# Patient Record
Sex: FEMALE | Race: WHITE | NOT HISPANIC OR LATINO | URBAN - METROPOLITAN AREA
[De-identification: names, ages, dates, MRNs, and addresses within clinical notes are randomized per-mention and may not be internally consistent; named-entity substitution may affect disease eponyms.]

---

## 2023-06-16 ENCOUNTER — OFFICE VISIT (OUTPATIENT)
Dept: PRIMARY CARE | Facility: CLINIC | Age: 53
End: 2023-06-16
Payer: COMMERCIAL

## 2023-06-16 ENCOUNTER — LAB (OUTPATIENT)
Dept: LAB | Facility: LAB | Age: 53
End: 2023-06-16
Payer: COMMERCIAL

## 2023-06-16 VITALS
BODY MASS INDEX: 27.68 KG/M2 | WEIGHT: 141 LBS | SYSTOLIC BLOOD PRESSURE: 140 MMHG | OXYGEN SATURATION: 97 % | HEART RATE: 80 BPM | HEIGHT: 60 IN | DIASTOLIC BLOOD PRESSURE: 78 MMHG | TEMPERATURE: 97.4 F

## 2023-06-16 DIAGNOSIS — E78.2 HYPERLIPIDEMIA, MIXED: ICD-10-CM

## 2023-06-16 DIAGNOSIS — G89.29 CHRONIC RIGHT SHOULDER PAIN: ICD-10-CM

## 2023-06-16 DIAGNOSIS — I10 HYPERTENSION, BENIGN: ICD-10-CM

## 2023-06-16 DIAGNOSIS — Z00.00 VISIT FOR PREVENTIVE HEALTH EXAMINATION: ICD-10-CM

## 2023-06-16 DIAGNOSIS — E55.9 VITAMIN D DEFICIENCY: ICD-10-CM

## 2023-06-16 DIAGNOSIS — M25.511 CHRONIC RIGHT SHOULDER PAIN: ICD-10-CM

## 2023-06-16 DIAGNOSIS — Z12.11 ENCOUNTER FOR SCREENING FOR MALIGNANT NEOPLASM OF COLON: ICD-10-CM

## 2023-06-16 DIAGNOSIS — Z00.00 VISIT FOR PREVENTIVE HEALTH EXAMINATION: Primary | ICD-10-CM

## 2023-06-16 PROBLEM — F32.A DEPRESSION: Status: ACTIVE | Noted: 2023-06-16

## 2023-06-16 PROBLEM — F41.9 ANXIETY: Status: ACTIVE | Noted: 2023-06-16

## 2023-06-16 LAB
ALANINE AMINOTRANSFERASE (SGPT) (U/L) IN SER/PLAS: 29 U/L (ref 7–45)
ALBUMIN (G/DL) IN SER/PLAS: 4.6 G/DL (ref 3.4–5)
ALKALINE PHOSPHATASE (U/L) IN SER/PLAS: 45 U/L (ref 33–110)
ANION GAP IN SER/PLAS: 13 MMOL/L (ref 10–20)
ASPARTATE AMINOTRANSFERASE (SGOT) (U/L) IN SER/PLAS: 27 U/L (ref 9–39)
BILIRUBIN TOTAL (MG/DL) IN SER/PLAS: 0.4 MG/DL (ref 0–1.2)
CALCIDIOL (25 OH VITAMIN D3) (NG/ML) IN SER/PLAS: 23 NG/ML
CALCIUM (MG/DL) IN SER/PLAS: 10.4 MG/DL (ref 8.6–10.6)
CARBON DIOXIDE, TOTAL (MMOL/L) IN SER/PLAS: 28 MMOL/L (ref 21–32)
CHLORIDE (MMOL/L) IN SER/PLAS: 106 MMOL/L (ref 98–107)
CHOLESTEROL (MG/DL) IN SER/PLAS: 226 MG/DL (ref 0–199)
CHOLESTEROL IN HDL (MG/DL) IN SER/PLAS: 64 MG/DL
CHOLESTEROL/HDL RATIO: 3.5
CREATININE (MG/DL) IN SER/PLAS: 0.87 MG/DL (ref 0.5–1.05)
ERYTHROCYTE DISTRIBUTION WIDTH (RATIO) BY AUTOMATED COUNT: 14.4 % (ref 11.5–14.5)
ERYTHROCYTE MEAN CORPUSCULAR HEMOGLOBIN CONCENTRATION (G/DL) BY AUTOMATED: 32 G/DL (ref 32–36)
ERYTHROCYTE MEAN CORPUSCULAR VOLUME (FL) BY AUTOMATED COUNT: 91 FL (ref 80–100)
ERYTHROCYTES (10*6/UL) IN BLOOD BY AUTOMATED COUNT: 4.85 X10E12/L (ref 4–5.2)
GFR FEMALE: 80 ML/MIN/1.73M2
GLUCOSE (MG/DL) IN SER/PLAS: 74 MG/DL (ref 74–99)
HEMATOCRIT (%) IN BLOOD BY AUTOMATED COUNT: 44.1 % (ref 36–46)
HEMOGLOBIN (G/DL) IN BLOOD: 14.1 G/DL (ref 12–16)
LDL: 111 MG/DL (ref 0–99)
LEUKOCYTES (10*3/UL) IN BLOOD BY AUTOMATED COUNT: 4.7 X10E9/L (ref 4.4–11.3)
NON HDL CHOLESTEROL: 162 MG/DL
NRBC (PER 100 WBCS) BY AUTOMATED COUNT: 0 /100 WBC (ref 0–0)
PLATELETS (10*3/UL) IN BLOOD AUTOMATED COUNT: 219 X10E9/L (ref 150–450)
POTASSIUM (MMOL/L) IN SER/PLAS: 4.5 MMOL/L (ref 3.5–5.3)
PROTEIN TOTAL: 7.1 G/DL (ref 6.4–8.2)
SEDIMENTATION RATE, ERYTHROCYTE: 13 MM/H (ref 0–30)
SODIUM (MMOL/L) IN SER/PLAS: 142 MMOL/L (ref 136–145)
THYROTROPIN (MIU/L) IN SER/PLAS BY DETECTION LIMIT <= 0.05 MIU/L: 0.96 MIU/L (ref 0.44–3.98)
TRIGLYCERIDE (MG/DL) IN SER/PLAS: 255 MG/DL (ref 0–149)
UREA NITROGEN (MG/DL) IN SER/PLAS: 14 MG/DL (ref 6–23)
VLDL: 51 MG/DL (ref 0–40)

## 2023-06-16 PROCEDURE — 99214 OFFICE O/P EST MOD 30 MIN: CPT | Performed by: FAMILY MEDICINE

## 2023-06-16 PROCEDURE — 82306 VITAMIN D 25 HYDROXY: CPT

## 2023-06-16 PROCEDURE — 36415 COLL VENOUS BLD VENIPUNCTURE: CPT

## 2023-06-16 PROCEDURE — 81001 URINALYSIS AUTO W/SCOPE: CPT

## 2023-06-16 PROCEDURE — 85652 RBC SED RATE AUTOMATED: CPT

## 2023-06-16 PROCEDURE — 3078F DIAST BP <80 MM HG: CPT | Performed by: FAMILY MEDICINE

## 2023-06-16 PROCEDURE — 84443 ASSAY THYROID STIM HORMONE: CPT

## 2023-06-16 PROCEDURE — 85027 COMPLETE CBC AUTOMATED: CPT

## 2023-06-16 PROCEDURE — 80053 COMPREHEN METABOLIC PANEL: CPT

## 2023-06-16 PROCEDURE — 1036F TOBACCO NON-USER: CPT | Performed by: FAMILY MEDICINE

## 2023-06-16 PROCEDURE — 3077F SYST BP >= 140 MM HG: CPT | Performed by: FAMILY MEDICINE

## 2023-06-16 PROCEDURE — 99396 PREV VISIT EST AGE 40-64: CPT | Performed by: FAMILY MEDICINE

## 2023-06-16 PROCEDURE — 80061 LIPID PANEL: CPT

## 2023-06-16 RX ORDER — ATORVASTATIN CALCIUM 40 MG/1
40 TABLET, FILM COATED ORAL DAILY
COMMUNITY
End: 2023-06-16 | Stop reason: SDUPTHER

## 2023-06-16 RX ORDER — MELOXICAM 15 MG/1
15 TABLET ORAL DAILY
Qty: 30 TABLET | Refills: 1 | Status: SHIPPED | OUTPATIENT
Start: 2023-06-16 | End: 2023-07-20 | Stop reason: SDUPTHER

## 2023-06-16 RX ORDER — DOXYCYCLINE 100 MG/1
100 CAPSULE ORAL 2 TIMES DAILY
COMMUNITY
Start: 2023-05-26 | End: 2023-09-22 | Stop reason: ALTCHOICE

## 2023-06-16 RX ORDER — BETAMETHASONE DIPROPIONATE 0.5 MG/G
CREAM TOPICAL AS NEEDED
COMMUNITY
Start: 2023-06-01

## 2023-06-16 RX ORDER — HYDROXYZINE HYDROCHLORIDE 25 MG/1
TABLET, FILM COATED ORAL
COMMUNITY
Start: 2023-02-17 | End: 2023-09-22 | Stop reason: SINTOL

## 2023-06-16 RX ORDER — ATENOLOL AND CHLORTHALIDONE TABLET 50; 25 MG/1; MG/1
1 TABLET ORAL DAILY
Qty: 90 TABLET | Refills: 2 | Status: SHIPPED | OUTPATIENT
Start: 2023-06-16 | End: 2024-06-15

## 2023-06-16 RX ORDER — ESTERIFIED ESTROGEN AND METHYLTESTOSTERONE .625; 1.25 MG/1; MG/1
1 TABLET ORAL DAILY
COMMUNITY
Start: 2023-05-23

## 2023-06-16 RX ORDER — NEOMYCIN SULFATE, POLYMYXIN B SULFATE, AND DEXAMETHASONE 3.5; 10000; 1 MG/G; [USP'U]/G; MG/G
OINTMENT OPHTHALMIC AS NEEDED
COMMUNITY
Start: 2023-05-26 | End: 2023-09-22 | Stop reason: ALTCHOICE

## 2023-06-16 RX ORDER — ATENOLOL AND CHLORTHALIDONE TABLET 50; 25 MG/1; MG/1
1 TABLET ORAL DAILY
COMMUNITY
End: 2023-06-16 | Stop reason: SDUPTHER

## 2023-06-16 RX ORDER — LOSARTAN POTASSIUM 100 MG/1
100 TABLET ORAL DAILY
Qty: 90 TABLET | Refills: 2 | Status: SHIPPED | OUTPATIENT
Start: 2023-06-16 | End: 2024-06-15

## 2023-06-16 RX ORDER — ATORVASTATIN CALCIUM 40 MG/1
40 TABLET, FILM COATED ORAL DAILY
Qty: 90 TABLET | Refills: 2 | Status: SHIPPED | OUTPATIENT
Start: 2023-06-16 | End: 2024-06-15

## 2023-06-16 RX ORDER — LOSARTAN POTASSIUM 100 MG/1
100 TABLET ORAL DAILY
COMMUNITY
End: 2023-06-16 | Stop reason: SDUPTHER

## 2023-06-16 RX ORDER — HYDROCORTISONE 25 MG/G
CREAM TOPICAL AS NEEDED
COMMUNITY
Start: 2022-11-02

## 2023-06-16 ASSESSMENT — LIFESTYLE VARIABLES
SKIP TO QUESTIONS 9-10: 1
AUDIT-C TOTAL SCORE: 2
HOW OFTEN DO YOU HAVE A DRINK CONTAINING ALCOHOL: 2-4 TIMES A MONTH
HOW MANY STANDARD DRINKS CONTAINING ALCOHOL DO YOU HAVE ON A TYPICAL DAY: 1 OR 2
HOW OFTEN DO YOU HAVE SIX OR MORE DRINKS ON ONE OCCASION: NEVER

## 2023-06-16 NOTE — PROGRESS NOTES
Subjective   Patient ID: Clair Miranda is a 52 y.o. female who presents for Annual Exam.    Assessment/Plan     Problem List Items Addressed This Visit       Visit for preventive health examination - Primary    Relevant Orders    Urinalysis Microscopic Only    TSH with reflex to Free T4 if abnormal    Lipid Panel    Comprehensive Metabolic Panel    CBC    CT cardiac scoring wo IV contrast    Hypertension, benign    Relevant Medications    atenoloL-chlorthalidone (Tenoretic) 50-25 mg tablet    losartan (Cozaar) 100 mg tablet    Other Relevant Orders    Urinalysis Microscopic Only    TSH with reflex to Free T4 if abnormal    Lipid Panel    Comprehensive Metabolic Panel    CBC    Sedimentation Rate    Hyperlipidemia, mixed    Relevant Orders    Urinalysis Microscopic Only    TSH with reflex to Free T4 if abnormal    Lipid Panel    Comprehensive Metabolic Panel    CBC    Vitamin D deficiency    Relevant Medications    atorvastatin (Lipitor) 40 mg tablet    Other Relevant Orders    Vitamin D, Total     Other Visit Diagnoses       Chronic right shoulder pain        Relevant Medications    meloxicam (Mobic) 15 mg tablet    Other Relevant Orders    Referral to Physical Therapy    Encounter for screening for malignant neoplasm of colon        Relevant Orders    Cologuard® colon cancer screening          Lasting lab work today  Discussed about age-related immunization  Continue current medication  CT chest for lung cancer screening -declined  Discussed about diet and exercise  Mammogram in July 2021 within normal limits  Cologuard previously patient did not do- ordered today  Follow-up with OB/GYN for breast exam mammogram  Status post hysterectomy    Follow-up in 6 month  Come back early with any new sign and symptoms. Patient understands and in agreement with plan.    HPI    52-year-old female here for physical and follow-up      TIA  hypertension anxiety nicotine dependence hyperlipidemia vitamin D deficiency  Multiple  complaints  Hot flushes was seen by OB/GYN started on hormonal pills  Weight gain  Rt shoulder pain    Rt great toe numbness    ? Rosacea was started on doxy started by opthal    Consider NSAIDs and Physical therapy    Recent labs reviewed  Gained weight  Smoking present occasional alcohol no drug use - stopped smoking since last 2 yrs. With occasional smoking in between  Stopped smoking in 2021 smoked more than 1 pack/day for more than 20 years  depression and anxiety - on Prozac - used in the past and helped       No new sign and symptoms    Allergies   Allergen Reactions    Vortioxetine Other       Current Outpatient Medications   Medication Sig Dispense Refill    betamethasone dipropionate 0.05 % cream Apply to affected area once daily for 5 days then 2 days off      doxycycline (Vibramycin) 100 mg capsule Take 1 capsule (100 mg) by mouth 2 times a day.      estrogens-methyltestosterone (Estratest HS) 0.625-1.25 mg tablet Take 1 tablet by mouth once daily.      hydrocortisone 2.5 % cream apply to affected areas on face and neck twice daily for 5 days on and 2 days off as needed      hydrOXYzine HCL (Atarax) 25 mg tablet TAKE ONE TABLET BY MOUTH AT night AS NEEDED for itch      neomycin-polymyxin B-dexameth (Polydex) 3.5 mg/g-10,000 unit/g-0.1 % ointment ophthalmic ointment apply a small amount to left and right upper and lower eye lids 3 times daily      atenoloL-chlorthalidone (Tenoretic) 50-25 mg tablet Take 1 tablet by mouth once daily. 90 tablet 2    atorvastatin (Lipitor) 40 mg tablet Take 1 tablet (40 mg) by mouth once daily. 90 tablet 2    losartan (Cozaar) 100 mg tablet Take 1 tablet (100 mg) by mouth once daily. 90 tablet 2    meloxicam (Mobic) 15 mg tablet Take 1 tablet (15 mg) by mouth once daily. 30 tablet 1     No current facility-administered medications for this visit.       Objective   Visit Vitals  /78 (BP Location: Left arm, Patient Position: Sitting)   Pulse 80   Temp 36.3 °C (97.4 °F)    Ht 1.524 m (5')   Wt 64 kg (141 lb)   SpO2 97%   BMI 27.54 kg/m²   Smoking Status Former   BSA 1.65 m²     Physical Exam    Constitutional   General appearance: Alert and in no acute distress.   Head and Face   Head and face: Normal.     Palpation of the face and sinuses: Normal.    Eyes   Inspection of eyes: Sclera and conjunctiva were normal.    Pupil exam: Pupils were equal in size. Extraocular movements were intact.   Ears, Nose, Mouth, and Throat   Ears: Auricles: Normal.    Otoscopic examination: Tympanic membranes: Normal with no congestion and no discharge. Otic Canals: Normal without tenderness, congestion or discharge.    Hearing: Normal.     Nasal mucosa, septum, and turbinates: Normal without edema or erythema.    Lips, teeth, and gums: Normal.    Oropharynx: Normal with moist mucus membranes, no congestion. Tonsils: Normal no follicles.   Neck   Neck Exam: Appearance of the neck was normal. No neck masses observed.    Thyroid exam: Not enlarged and no palpable thyroid nodules.   Pulmonary   Respiratory assessment: No respiratory distress, normal respiratory rhythm and effort.    Auscultation of Lungs: Clear bilateral breath sounds.   Cardiovascular   Auscultation of heart: Apical pulse normal, heart rate and rhythm normal, normal S1 and S2, no murmurs and no pericardial rub.    Carotid arteries: Pulses normal with no bruits.    Exam for edema: No peripheral edema.   Chest   Chest: Normal A_P diameter, no pulsation, no intercostal withdrawing. Trachea central.   Abdomen   Abdominal Exam: No bruits, normal bowel sounds, soft, non-tender, no abdominal mass palpated.    Liver and Spleen exam: No hepato-splenomegaly.    Examination for hernias: Normal.    Lymphatic   Palpation of lymph nodes in neck: No cervical lymphadenopathy.   Musculoskeletal   Examination of gait: Normal.    Inspection of digits and nails: No clubbing or cyanosis of the fingernails.    Inspection/palpation of joints, bones and  muscles: No joint swelling. Normal movement of all extremities.    Range of Motion: Normal movement of all extremities.   Skin   Skin inspection: Normal skin color and pigmentation, normal skin turgor and no visible rash.   Neurologic   Cranial nerves: Nerves 2-12 were intact, no focal neuro defects.    Reflexes: Normal.     Sensation: Normal.     Coordination: Normal.    Psychiatric   Judgment and insight: Intact.    Orientation: Oriented to person, place, and time.    Recent and remote memory: Normal.     Mood and affect: Normal.     Genitourinary Declined.    Immunization History   Administered Date(s) Administered    Pfizer Purple Cap SARS-CoV-2 03/28/2021, 04/19/2021       Review of Systems    No visits with results within 4 Month(s) from this visit.   Latest known visit with results is:   Legacy Encounter on 06/07/2022   Component Date Value Ref Range Status    TSH 06/07/2022 1.02  0.44 - 3.98 mIU/L Final       Radiology: Reviewed imaging in powerchart.  No results found.    No family history on file.  Social History     Socioeconomic History    Marital status: Unknown     Spouse name: None    Number of children: None    Years of education: None    Highest education level: None   Occupational History    None   Tobacco Use    Smoking status: Former     Packs/day: 1.00     Years: 25.00     Total pack years: 25.00     Types: Cigarettes    Smokeless tobacco: Never   Substance and Sexual Activity    Alcohol use: Not Currently    Drug use: Never    Sexual activity: None   Other Topics Concern    None   Social History Narrative    None     Social Determinants of Health     Financial Resource Strain: Not on file   Food Insecurity: Not on file   Transportation Needs: Not on file   Physical Activity: Not on file   Stress: Not on file   Social Connections: Not on file   Intimate Partner Violence: Not on file   Housing Stability: Not on file     History reviewed. No pertinent past medical history.  Past Surgical History:    Procedure Laterality Date    HYSTERECTOMY  08/24/2017    Hysterectomy    HYSTEROSCOPY  09/03/2014    Hysteroscopy With Endometrial Ablation    OTHER SURGICAL HISTORY  09/03/2014    Cervical Conization    OTHER SURGICAL HISTORY  09/03/2014    Dental Surgery    RHINOPLASTY  09/03/2014    Rhinoplasty    TONSILLECTOMY  09/03/2014    Tonsillectomy With Adenoidectomy       Charting was completed using voice recognition technology and may include unintended errors.

## 2023-06-17 LAB
BACTERIA, URINE: ABNORMAL /HPF
RBC, URINE: ABNORMAL /HPF (ref 0–5)
SQUAMOUS EPITHELIAL CELLS, URINE: 3 /HPF
WBC, URINE: <1 /HPF (ref 0–5)

## 2023-06-19 DIAGNOSIS — E55.9 VITAMIN D DEFICIENCY: Primary | ICD-10-CM

## 2023-06-19 NOTE — TELEPHONE ENCOUNTER
Called and informed Pt. She would like to know if she should start wegovy or ozempic based on her lab results

## 2023-06-19 NOTE — TELEPHONE ENCOUNTER
----- Message from Damien Harris MD sent at 6/19/2023 12:58 PM EDT -----  Please call the patient regarding her abnormal result.  Labs look okay except vitamin D is low patient can take 50,000 unit every week for next 3 months.  Consider low-carb diet.  Continue atorvastatin..

## 2023-06-20 RX ORDER — ERGOCALCIFEROL 1.25 MG/1
50000 CAPSULE ORAL
Qty: 4 CAPSULE | Refills: 2 | Status: SHIPPED | OUTPATIENT
Start: 2023-06-20 | End: 2023-08-15

## 2023-06-21 DIAGNOSIS — E66.3 OVERWEIGHT WITH BODY MASS INDEX (BMI) OF 27 TO 27.9 IN ADULT: ICD-10-CM

## 2023-06-21 DIAGNOSIS — I10 HYPERTENSION, BENIGN: Primary | ICD-10-CM

## 2023-06-21 DIAGNOSIS — E78.2 HYPERLIPIDEMIA, MIXED: ICD-10-CM

## 2023-06-21 RX ORDER — PHENTERMINE HYDROCHLORIDE 37.5 MG/1
37.5 TABLET ORAL
Qty: 30 TABLET | Refills: 0 | Status: SHIPPED | OUTPATIENT
Start: 2023-06-21 | End: 2023-07-20 | Stop reason: SDUPTHER

## 2023-07-20 ENCOUNTER — OFFICE VISIT (OUTPATIENT)
Dept: PRIMARY CARE | Facility: CLINIC | Age: 53
End: 2023-07-20
Payer: COMMERCIAL

## 2023-07-20 VITALS
OXYGEN SATURATION: 99 % | HEART RATE: 79 BPM | DIASTOLIC BLOOD PRESSURE: 72 MMHG | BODY MASS INDEX: 27.17 KG/M2 | WEIGHT: 138.4 LBS | TEMPERATURE: 94.8 F | SYSTOLIC BLOOD PRESSURE: 116 MMHG | HEIGHT: 60 IN

## 2023-07-20 DIAGNOSIS — I10 HYPERTENSION, BENIGN: ICD-10-CM

## 2023-07-20 DIAGNOSIS — E78.2 HYPERLIPIDEMIA, MIXED: ICD-10-CM

## 2023-07-20 DIAGNOSIS — M25.511 CHRONIC RIGHT SHOULDER PAIN: ICD-10-CM

## 2023-07-20 DIAGNOSIS — T75.3XXD MOTION SICKNESS, SUBSEQUENT ENCOUNTER: ICD-10-CM

## 2023-07-20 DIAGNOSIS — G89.29 CHRONIC RIGHT SHOULDER PAIN: ICD-10-CM

## 2023-07-20 DIAGNOSIS — E66.3 OVERWEIGHT WITH BODY MASS INDEX (BMI) OF 27 TO 27.9 IN ADULT: Primary | ICD-10-CM

## 2023-07-20 PROCEDURE — 3078F DIAST BP <80 MM HG: CPT | Performed by: FAMILY MEDICINE

## 2023-07-20 PROCEDURE — 1036F TOBACCO NON-USER: CPT | Performed by: FAMILY MEDICINE

## 2023-07-20 PROCEDURE — 99214 OFFICE O/P EST MOD 30 MIN: CPT | Performed by: FAMILY MEDICINE

## 2023-07-20 PROCEDURE — 3074F SYST BP LT 130 MM HG: CPT | Performed by: FAMILY MEDICINE

## 2023-07-20 PROCEDURE — 3008F BODY MASS INDEX DOCD: CPT | Performed by: FAMILY MEDICINE

## 2023-07-20 RX ORDER — MELOXICAM 15 MG/1
15 TABLET ORAL DAILY
Qty: 90 TABLET | Refills: 2 | Status: SHIPPED | OUTPATIENT
Start: 2023-07-20 | End: 2024-07-19

## 2023-07-20 RX ORDER — SCOLOPAMINE TRANSDERMAL SYSTEM 1 MG/1
1 PATCH, EXTENDED RELEASE TRANSDERMAL
Qty: 10 PATCH | Refills: 0 | Status: SHIPPED | OUTPATIENT
Start: 2023-07-20 | End: 2023-09-18

## 2023-07-20 RX ORDER — PHENTERMINE HYDROCHLORIDE 37.5 MG/1
37.5 TABLET ORAL
Qty: 30 TABLET | Refills: 0 | Status: SHIPPED | OUTPATIENT
Start: 2023-07-20 | End: 2023-10-16

## 2023-07-20 NOTE — PROGRESS NOTES
Subjective   Patient ID: Clair Miranda is a 52 y.o. female who presents for Med Refill.    Assessment/Plan     Problem List Items Addressed This Visit       Hypertension, benign    Relevant Medications    phentermine (Adipex-P) 37.5 mg tablet    Hyperlipidemia, mixed    Relevant Medications    phentermine (Adipex-P) 37.5 mg tablet     Other Visit Diagnoses       Overweight with body mass index (BMI) of 27 to 27.9 in adult    -  Primary    Relevant Medications    phentermine (Adipex-P) 37.5 mg tablet    Chronic right shoulder pain        Relevant Medications    meloxicam (Mobic) 15 mg tablet    Motion sickness, subsequent encounter        Relevant Medications    scopolamine (Transderm-Scop) 1 mg over 3 days patch 3 day        Previous visit  Lasting lab work today  Discussed about age-related immunization  Continue current medication  CT chest for lung cancer screening -declined  Discussed about diet and exercise  Mammogram in July 2021 within normal limits  Cologuard previously patient did not do- ordered today  Follow-up with OB/GYN for breast exam mammogram  Status post hysterectomy    Follow-up in 6 month  Come back early with any new sign and symptoms. Patient understands and in agreement with plan.    Med Refill        52-year-old female here for follow-up on BMI 28 and hypertension  Started on Adipex-lost 3 pounds tolerating medication well would like to continue    Patient will be traveling -needs medication for motion sickness  Also needs refill on meloxicam  Follow-up in a month        Previous visit  TIA  hypertension anxiety nicotine dependence hyperlipidemia vitamin D deficiency  Multiple complaints  Hot flushes was seen by OB/GYN started on hormonal pills  Weight gain  Rt shoulder pain    Rt great toe numbness    ? Rosacea was started on doxy started by opthal    Consider NSAIDs and Physical therapy    Recent labs reviewed  Gained weight  Smoking present occasional alcohol no drug use - stopped smoking  since last 2 yrs. With occasional smoking in between  Stopped smoking in 2021 smoked more than 1 pack/day for more than 20 years  depression and anxiety - on Prozac - used in the past and helped       No new sign and symptoms    Allergies   Allergen Reactions    Vortioxetine Other       Current Outpatient Medications   Medication Sig Dispense Refill    atenoloL-chlorthalidone (Tenoretic) 50-25 mg tablet Take 1 tablet by mouth once daily. 90 tablet 2    atorvastatin (Lipitor) 40 mg tablet Take 1 tablet (40 mg) by mouth once daily. 90 tablet 2    betamethasone dipropionate 0.05 % cream if needed.      ergocalciferol (Vitamin D-2) 1.25 MG (93893 UT) capsule Take 1 capsule (50,000 Units) by mouth 1 (one) time per week. 4 capsule 2    estrogens-methyltestosterone (Estratest HS) 0.625-1.25 mg tablet Take 1 tablet by mouth once daily.      hydrocortisone 2.5 % cream if needed.      hydrOXYzine HCL (Atarax) 25 mg tablet TAKE ONE TABLET BY MOUTH AT night AS NEEDED for itch      losartan (Cozaar) 100 mg tablet Take 1 tablet (100 mg) by mouth once daily. 90 tablet 2    neomycin-polymyxin B-dexameth (Polydex) 3.5 mg/g-10,000 unit/g-0.1 % ointment ophthalmic ointment if needed.      doxycycline (Vibramycin) 100 mg capsule Take 1 capsule (100 mg) by mouth 2 times a day.      meloxicam (Mobic) 15 mg tablet Take 1 tablet (15 mg) by mouth once daily. 90 tablet 2    phentermine (Adipex-P) 37.5 mg tablet Take 1 tablet (37.5 mg) by mouth once daily in the morning. Take before meals. 30 tablet 0    scopolamine (Transderm-Scop) 1 mg over 3 days patch 3 day Place 1 patch over 72 hours on the skin every 3rd day if needed (nausea). 10 patch 0     No current facility-administered medications for this visit.       Objective   Visit Vitals  /72   Pulse 79   Temp 34.9 °C (94.8 °F)   Ht 1.524 m (5')   Wt 62.8 kg (138 lb 6.4 oz)   SpO2 99%   BMI 27.03 kg/m²   Smoking Status Former   BSA 1.63 m²     Physical Exam    Constitutional   General  appearance: Alert and in no acute distress.   Head and Face   Head and face: Normal.     Palpation of the face and sinuses: Normal.    Eyes   Inspection of eyes: Sclera and conjunctiva were normal.    Pupil exam: Pupils were equal in size. Extraocular movements were intact.   Ears, Nose, Mouth, and Throat   Ears: Auricles: Normal.    Otoscopic examination: Tympanic membranes: Normal with no congestion and no discharge. Otic Canals: Normal without tenderness, congestion or discharge.    Hearing: Normal.     Nasal mucosa, septum, and turbinates: Normal without edema or erythema.    Lips, teeth, and gums: Normal.    Oropharynx: Normal with moist mucus membranes, no congestion. Tonsils: Normal no follicles.   Neck   Neck Exam: Appearance of the neck was normal. No neck masses observed.    Thyroid exam: Not enlarged and no palpable thyroid nodules.   Pulmonary   Respiratory assessment: No respiratory distress, normal respiratory rhythm and effort.    Auscultation of Lungs: Clear bilateral breath sounds.   Cardiovascular   Auscultation of heart: Apical pulse normal, heart rate and rhythm normal, normal S1 and S2, no murmurs and no pericardial rub.    Carotid arteries: Pulses normal with no bruits.    Exam for edema: No peripheral edema.   Chest   Chest: Normal A_P diameter, no pulsation, no intercostal withdrawing. Trachea central.   Abdomen   Abdominal Exam: No bruits, normal bowel sounds, soft, non-tender, no abdominal mass palpated.    Liver and Spleen exam: No hepato-splenomegaly.    Examination for hernias: Normal.    Lymphatic   Palpation of lymph nodes in neck: No cervical lymphadenopathy.   Musculoskeletal   Examination of gait: Normal.    Inspection of digits and nails: No clubbing or cyanosis of the fingernails.    Inspection/palpation of joints, bones and muscles: No joint swelling. Normal movement of all extremities.    Range of Motion: Normal movement of all extremities.   Skin   Skin inspection: Normal skin  color and pigmentation, normal skin turgor and no visible rash.   Neurologic   Cranial nerves: Nerves 2-12 were intact, no focal neuro defects.    Reflexes: Normal.     Sensation: Normal.     Coordination: Normal.    Psychiatric   Judgment and insight: Intact.    Orientation: Oriented to person, place, and time.    Recent and remote memory: Normal.     Mood and affect: Normal.     Genitourinary Declined.    Immunization History   Administered Date(s) Administered    Pfizer Purple Cap SARS-CoV-2 03/28/2021, 04/19/2021       Review of Systems    Lab on 06/16/2023   Component Date Value Ref Range Status    Vitamin D, 25-Hydroxy 06/16/2023 23 (A)  ng/mL Final    WBC, Urine 06/16/2023 <1  0 - 5 /HPF Final    RBC, Urine 06/16/2023 None  0 - 5 /HPF Final    Squamous Epithelial Cells, Urine 06/16/2023 3  /HPF Final    Bacteria, Urine 06/16/2023 1+ (A)  /HPF Final    TSH 06/16/2023 0.96  0.44 - 3.98 mIU/L Final    Cholesterol 06/16/2023 226 (H)  0 - 199 mg/dL Final    HDL 06/16/2023 64.0  mg/dL Final    Cholesterol/HDL Ratio 06/16/2023 3.5   Final    LDL 06/16/2023 111 (H)  0 - 99 mg/dL Final    VLDL 06/16/2023 51 (H)  0 - 40 mg/dL Final    Triglycerides 06/16/2023 255 (H)  0 - 149 mg/dL Final    Non HDL Cholesterol 06/16/2023 162  mg/dL Final    Glucose 06/16/2023 74  74 - 99 mg/dL Final    Sodium 06/16/2023 142  136 - 145 mmol/L Final    Potassium 06/16/2023 4.5  3.5 - 5.3 mmol/L Final    Chloride 06/16/2023 106  98 - 107 mmol/L Final    Bicarbonate 06/16/2023 28  21 - 32 mmol/L Final    Anion Gap 06/16/2023 13  10 - 20 mmol/L Final    Urea Nitrogen 06/16/2023 14  6 - 23 mg/dL Final    Creatinine 06/16/2023 0.87  0.50 - 1.05 mg/dL Final    GFR Female 06/16/2023 80  >90 mL/min/1.73m2 Final    Calcium 06/16/2023 10.4  8.6 - 10.6 mg/dL Final    Albumin 06/16/2023 4.6  3.4 - 5.0 g/dL Final    Alkaline Phosphatase 06/16/2023 45  33 - 110 U/L Final    Total Protein 06/16/2023 7.1  6.4 - 8.2 g/dL Final    AST 06/16/2023 27  9 -  39 U/L Final    Total Bilirubin 06/16/2023 0.4  0.0 - 1.2 mg/dL Final    ALT (SGPT) 06/16/2023 29  7 - 45 U/L Final    WBC 06/16/2023 4.7  4.4 - 11.3 x10E9/L Final    nRBC 06/16/2023 0.0  0.0 - 0.0 /100 WBC Final    RBC 06/16/2023 4.85  4.00 - 5.20 x10E12/L Final    Hemoglobin 06/16/2023 14.1  12.0 - 16.0 g/dL Final    Hematocrit 06/16/2023 44.1  36.0 - 46.0 % Final    MCV 06/16/2023 91  80 - 100 fL Final    MCHC 06/16/2023 32.0  32.0 - 36.0 g/dL Final    Platelets 06/16/2023 219  150 - 450 x10E9/L Final    RDW 06/16/2023 14.4  11.5 - 14.5 % Final    Sedimentation Rate 06/16/2023 13  0 - 30 mm/h Final       Radiology: Reviewed imaging in powerchart.  No results found.    No family history on file.  Social History     Socioeconomic History    Marital status: Unknown     Spouse name: None    Number of children: None    Years of education: None    Highest education level: None   Occupational History    None   Tobacco Use    Smoking status: Former     Packs/day: 1.00     Years: 25.00     Total pack years: 25.00     Types: Cigarettes    Smokeless tobacco: Never   Substance and Sexual Activity    Alcohol use: Not Currently    Drug use: Never    Sexual activity: None   Other Topics Concern    None   Social History Narrative    None     Social Determinants of Health     Financial Resource Strain: Not on file   Food Insecurity: Not on file   Transportation Needs: Not on file   Physical Activity: Not on file   Stress: Not on file   Social Connections: Not on file   Intimate Partner Violence: Not on file   Housing Stability: Not on file     History reviewed. No pertinent past medical history.  Past Surgical History:   Procedure Laterality Date    HYSTERECTOMY  08/24/2017    Hysterectomy    HYSTEROSCOPY  09/03/2014    Hysteroscopy With Endometrial Ablation    OTHER SURGICAL HISTORY  09/03/2014    Cervical Conization    OTHER SURGICAL HISTORY  09/03/2014    Dental Surgery    RHINOPLASTY  09/03/2014    Rhinoplasty     TONSILLECTOMY  09/03/2014    Tonsillectomy With Adenoidectomy       Charting was completed using voice recognition technology and may include unintended errors.

## 2023-08-17 ENCOUNTER — APPOINTMENT (OUTPATIENT)
Dept: PRIMARY CARE | Facility: CLINIC | Age: 53
End: 2023-08-17
Payer: COMMERCIAL

## 2023-09-22 ENCOUNTER — OFFICE VISIT (OUTPATIENT)
Dept: PRIMARY CARE | Facility: CLINIC | Age: 53
End: 2023-09-22
Payer: COMMERCIAL

## 2023-09-22 VITALS
SYSTOLIC BLOOD PRESSURE: 156 MMHG | HEIGHT: 60 IN | DIASTOLIC BLOOD PRESSURE: 88 MMHG | BODY MASS INDEX: 27.09 KG/M2 | OXYGEN SATURATION: 93 % | HEART RATE: 96 BPM | WEIGHT: 138 LBS

## 2023-09-22 DIAGNOSIS — J01.11 ACUTE RECURRENT FRONTAL SINUSITIS: Primary | ICD-10-CM

## 2023-09-22 DIAGNOSIS — J30.1 ALLERGIC RHINITIS DUE TO POLLEN, UNSPECIFIED SEASONALITY: ICD-10-CM

## 2023-09-22 DIAGNOSIS — I10 HYPERTENSION, BENIGN: ICD-10-CM

## 2023-09-22 DIAGNOSIS — B37.31 VULVOVAGINAL CANDIDIASIS: ICD-10-CM

## 2023-09-22 DIAGNOSIS — R05.8 UPPER AIRWAY COUGH SYNDROME: ICD-10-CM

## 2023-09-22 PROCEDURE — 3008F BODY MASS INDEX DOCD: CPT | Performed by: STUDENT IN AN ORGANIZED HEALTH CARE EDUCATION/TRAINING PROGRAM

## 2023-09-22 PROCEDURE — 3079F DIAST BP 80-89 MM HG: CPT | Performed by: STUDENT IN AN ORGANIZED HEALTH CARE EDUCATION/TRAINING PROGRAM

## 2023-09-22 PROCEDURE — 1036F TOBACCO NON-USER: CPT | Performed by: STUDENT IN AN ORGANIZED HEALTH CARE EDUCATION/TRAINING PROGRAM

## 2023-09-22 PROCEDURE — 3077F SYST BP >= 140 MM HG: CPT | Performed by: STUDENT IN AN ORGANIZED HEALTH CARE EDUCATION/TRAINING PROGRAM

## 2023-09-22 PROCEDURE — 99213 OFFICE O/P EST LOW 20 MIN: CPT | Performed by: STUDENT IN AN ORGANIZED HEALTH CARE EDUCATION/TRAINING PROGRAM

## 2023-09-22 RX ORDER — METHYLPREDNISOLONE 4 MG/1
TABLET ORAL
Qty: 21 TABLET | Refills: 0 | Status: SHIPPED | OUTPATIENT
Start: 2023-09-22 | End: 2023-09-29

## 2023-09-22 RX ORDER — FLUCONAZOLE 150 MG/1
150 TABLET ORAL ONCE
Qty: 1 TABLET | Refills: 0 | Status: SHIPPED | OUTPATIENT
Start: 2023-09-22 | End: 2023-09-22

## 2023-09-22 RX ORDER — AMOXICILLIN AND CLAVULANATE POTASSIUM 875; 125 MG/1; MG/1
875 TABLET, FILM COATED ORAL 2 TIMES DAILY
Qty: 10 TABLET | Refills: 0 | Status: SHIPPED | OUTPATIENT
Start: 2023-09-22 | End: 2023-09-27

## 2023-09-22 RX ORDER — GUAIFENESIN AND DEXTROMETHORPHAN HYDROBROMIDE 10; 100 MG/5ML; MG/5ML
5 SYRUP ORAL 3 TIMES DAILY PRN
Qty: 118 ML | Refills: 0 | Status: SHIPPED | OUTPATIENT
Start: 2023-09-22 | End: 2023-10-02

## 2023-09-22 NOTE — PROGRESS NOTES
Subjective   Patient ID: Clair Miranda is a 53 y.o. female who presents for the following    Assessment/Plan   #Acute Sinusitis  #UACS  #Allergic Rhinitis   #Vulvovaginal Candidiasis   #HTN    -IN office BP is elevated but patient has not taken BP meds in 3 days. Repeat shows mild decrease in BP.     PLAN  -Augmentin x 5 days rx  -Medrol dose pack rx  -Flonase BID home dose continued   -Zyrtec daily home dose   -Supportive care with APAP PRN for fevers, chills, myalgias  -Diflucan once rx for vulvovaginal candidiasis   -For BP; patient has not taken BP meds in 3 days. Advised to stop taking pseudophed and to resume her home BP medications today.   -Encouraged oral hydration       Advised to follow up for worsening symptoms or go to urgent care if symptoms progress.     Follow up with PCP at next scheduled visit     HPI  53F presents for acute sick visit evaluation. For the past 2 days she has had sinus pressure, clear drainage, HA, sore throat, post nasal drip and upper airway cough. She has tried OTC medications without major relief. She is COVID vaccinated but has not had a booster this year. Lastly, patient states that she gets frequent yeast infections after using antibiotics. Otherwise no other complaints at this time.     Denies fevers, chills, weight loss, lightheadedness, dizziness, vision changes,  CP, SOB, palpitations, n/v/d, abd pain, black/bloody stools, arthralgias, or new numbness/weakness/tingling in arms/legs/face.      Social  No major use of tobacco, alcohol or drugs      Visit Vitals  BP (!) 166/93   Pulse 106   Ht 1.524 m (5')   Wt 62.6 kg (138 lb)   SpO2 93%   BMI 26.95 kg/m²   Smoking Status Former   BSA 1.63 m²     PHYSICAL EXAM   Physical Exam     Visit Vitals  /88   Pulse 96   Ht 1.524 m (5')   Wt 62.6 kg (138 lb)   SpO2 93%   BMI 26.95 kg/m²   Smoking Status Former   BSA 1.63 m²        General: NAD. NCAT. Aox3   HEENT: PERRLA. EOMI. BL nasal erythema with L sided nasal polyps seen.  Posterior pharyngeal injection without exudate. No CLAD. BL frontal/maxillary sinus fullness with tenderness.   Cardiovascular: Tachycardic rate, regular rhythm. No MRG. S1/S2 wnl.   Respiratory: CTABL. No acute respiratory distress.   GI: Soft, NT abdomen. B  MSK: ROM x 4.   Extremities: No edema. Cap refill < 2 sec.   Skin: No rashes or bruises.   Neuro: Aox3. Cranial Nerves grossly intact. Motor/sensory wnl.   Psych: Mood wnl.     REVIEW OF SYSTEMS   ROS in HPI     Allergies   Allergen Reactions    Vortioxetine Other and Nausea/vomiting       Current Outpatient Medications   Medication Sig Dispense Refill    atenoloL-chlorthalidone (Tenoretic) 50-25 mg tablet Take 1 tablet by mouth once daily. 90 tablet 2    atorvastatin (Lipitor) 40 mg tablet Take 1 tablet (40 mg) by mouth once daily. 90 tablet 2    betamethasone dipropionate 0.05 % cream if needed.      estrogens-methyltestosterone (Estratest HS) 0.625-1.25 mg tablet Take 1 tablet by mouth once daily.      hydrocortisone 2.5 % cream if needed.      hydrOXYzine HCL (Atarax) 25 mg tablet TAKE ONE TABLET BY MOUTH AT night AS NEEDED for itch      meloxicam (Mobic) 15 mg tablet Take 1 tablet (15 mg) by mouth once daily. 90 tablet 2    losartan (Cozaar) 100 mg tablet Take 1 tablet (100 mg) by mouth once daily. (Patient not taking: Reported on 9/22/2023) 90 tablet 2    phentermine (Adipex-P) 37.5 mg tablet Take 1 tablet (37.5 mg) by mouth once daily in the morning. Take before meals. 30 tablet 0     No current facility-administered medications for this visit.       Objective     Lab on 06/16/2023   Component Date Value Ref Range Status    Vitamin D, 25-Hydroxy 06/16/2023 23 (A)  ng/mL Final    WBC, Urine 06/16/2023 <1  0 - 5 /HPF Final    RBC, Urine 06/16/2023 None  0 - 5 /HPF Final    Squamous Epithelial Cells, Urine 06/16/2023 3  /HPF Final    Bacteria, Urine 06/16/2023 1+ (A)  /HPF Final    TSH 06/16/2023 0.96  0.44 - 3.98 mIU/L Final    Cholesterol 06/16/2023 226  (H)  0 - 199 mg/dL Final    HDL 06/16/2023 64.0  mg/dL Final    Cholesterol/HDL Ratio 06/16/2023 3.5   Final    LDL 06/16/2023 111 (H)  0 - 99 mg/dL Final    VLDL 06/16/2023 51 (H)  0 - 40 mg/dL Final    Triglycerides 06/16/2023 255 (H)  0 - 149 mg/dL Final    Non HDL Cholesterol 06/16/2023 162  mg/dL Final    Glucose 06/16/2023 74  74 - 99 mg/dL Final    Sodium 06/16/2023 142  136 - 145 mmol/L Final    Potassium 06/16/2023 4.5  3.5 - 5.3 mmol/L Final    Chloride 06/16/2023 106  98 - 107 mmol/L Final    Bicarbonate 06/16/2023 28  21 - 32 mmol/L Final    Anion Gap 06/16/2023 13  10 - 20 mmol/L Final    Urea Nitrogen 06/16/2023 14  6 - 23 mg/dL Final    Creatinine 06/16/2023 0.87  0.50 - 1.05 mg/dL Final    GFR Female 06/16/2023 80  >90 mL/min/1.73m2 Final    Calcium 06/16/2023 10.4  8.6 - 10.6 mg/dL Final    Albumin 06/16/2023 4.6  3.4 - 5.0 g/dL Final    Alkaline Phosphatase 06/16/2023 45  33 - 110 U/L Final    Total Protein 06/16/2023 7.1  6.4 - 8.2 g/dL Final    AST 06/16/2023 27  9 - 39 U/L Final    Total Bilirubin 06/16/2023 0.4  0.0 - 1.2 mg/dL Final    ALT (SGPT) 06/16/2023 29  7 - 45 U/L Final    WBC 06/16/2023 4.7  4.4 - 11.3 x10E9/L Final    nRBC 06/16/2023 0.0  0.0 - 0.0 /100 WBC Final    RBC 06/16/2023 4.85  4.00 - 5.20 x10E12/L Final    Hemoglobin 06/16/2023 14.1  12.0 - 16.0 g/dL Final    Hematocrit 06/16/2023 44.1  36.0 - 46.0 % Final    MCV 06/16/2023 91  80 - 100 fL Final    MCHC 06/16/2023 32.0  32.0 - 36.0 g/dL Final    Platelets 06/16/2023 219  150 - 450 x10E9/L Final    RDW 06/16/2023 14.4  11.5 - 14.5 % Final    Sedimentation Rate 06/16/2023 13  0 - 30 mm/h Final       Radiology: Reviewed imaging in powerchart.  No results found.    No family history on file.  Social History     Socioeconomic History    Marital status: Unknown     Spouse name: None    Number of children: None    Years of education: None    Highest education level: None   Occupational History    None   Tobacco Use    Smoking  status: Former     Packs/day: 1.00     Years: 25.00     Additional pack years: 0.00     Total pack years: 25.00     Types: Cigarettes    Smokeless tobacco: Never   Vaping Use    Vaping Use: Never used   Substance and Sexual Activity    Alcohol use: Not Currently    Drug use: Never    Sexual activity: None   Other Topics Concern    None   Social History Narrative    None     Social Determinants of Health     Financial Resource Strain: Not on file   Food Insecurity: Not on file   Transportation Needs: Not on file   Physical Activity: Not on file   Stress: Not on file   Social Connections: Not on file   Intimate Partner Violence: Not on file   Housing Stability: Not on file     History reviewed. No pertinent past medical history.  Past Surgical History:   Procedure Laterality Date    HYSTERECTOMY  08/24/2017    Hysterectomy    HYSTEROSCOPY  09/03/2014    Hysteroscopy With Endometrial Ablation    OTHER SURGICAL HISTORY  09/03/2014    Cervical Conization    OTHER SURGICAL HISTORY  09/03/2014    Dental Surgery    RHINOPLASTY  09/03/2014    Rhinoplasty    TONSILLECTOMY  09/03/2014    Tonsillectomy With Adenoidectomy       Charting was completed using voice recognition technology and may include unintended errors.

## 2023-10-13 PROBLEM — N95.1 PERIMENOPAUSAL: Status: ACTIVE | Noted: 2023-10-13

## 2023-10-13 PROBLEM — R10.32 SEVERE LEFT GROIN PAIN: Status: ACTIVE | Noted: 2023-10-13

## 2023-10-13 PROBLEM — K64.4 EXTERNAL HEMORRHOIDS: Status: ACTIVE | Noted: 2023-10-13

## 2023-10-13 PROBLEM — K58.9 IBS (IRRITABLE BOWEL SYNDROME): Status: ACTIVE | Noted: 2023-10-13

## 2023-10-13 PROBLEM — G45.9 TIA DUE TO EMBOLISM (MULTI): Status: ACTIVE | Noted: 2023-10-13

## 2023-10-13 PROBLEM — J02.0 STREP PHARYNGITIS: Status: ACTIVE | Noted: 2023-10-13

## 2023-10-13 PROBLEM — H66.90 OTITIS MEDIA: Status: ACTIVE | Noted: 2023-10-13

## 2023-10-13 PROBLEM — K62.3 RECTAL MUCOSA PROLAPSE: Status: ACTIVE | Noted: 2023-10-13

## 2023-10-13 PROBLEM — I74.9 TIA DUE TO EMBOLISM (MULTI): Status: ACTIVE | Noted: 2023-10-13

## 2023-10-13 PROBLEM — J34.2 DEVIATED SEPTUM: Status: ACTIVE | Noted: 2023-10-13

## 2023-10-13 PROBLEM — F41.8 ANXIETY ASSOCIATED WITH DEPRESSION: Status: ACTIVE | Noted: 2023-10-13

## 2023-10-13 PROBLEM — H01.009 BLEPHARITIS: Status: ACTIVE | Noted: 2023-10-13

## 2023-10-13 PROBLEM — H21.562 PUPIL IRREGULAR OF LEFT EYE: Status: ACTIVE | Noted: 2023-10-13

## 2023-10-13 PROBLEM — R03.0 FINDING OF ABOVE NORMAL BLOOD PRESSURE: Status: ACTIVE | Noted: 2023-10-13

## 2023-10-13 PROBLEM — E78.1 HYPERTRIGLYCERIDEMIA: Status: ACTIVE | Noted: 2023-05-22

## 2023-10-16 ENCOUNTER — OFFICE VISIT (OUTPATIENT)
Dept: OTOLARYNGOLOGY | Facility: CLINIC | Age: 53
End: 2023-10-16
Payer: COMMERCIAL

## 2023-10-16 VITALS — TEMPERATURE: 98.6 F | HEIGHT: 60 IN | WEIGHT: 135 LBS | BODY MASS INDEX: 26.5 KG/M2

## 2023-10-16 DIAGNOSIS — R44.8 FACIAL PRESSURE: ICD-10-CM

## 2023-10-16 DIAGNOSIS — J34.89 NASAL DRAINAGE: ICD-10-CM

## 2023-10-16 DIAGNOSIS — J34.3 HYPERTROPHY OF BOTH INFERIOR NASAL TURBINATES: ICD-10-CM

## 2023-10-16 DIAGNOSIS — R09.81 NASAL CONGESTION: Primary | ICD-10-CM

## 2023-10-16 PROCEDURE — 1036F TOBACCO NON-USER: CPT | Performed by: NURSE PRACTITIONER

## 2023-10-16 PROCEDURE — 3008F BODY MASS INDEX DOCD: CPT | Performed by: NURSE PRACTITIONER

## 2023-10-16 PROCEDURE — 99204 OFFICE O/P NEW MOD 45 MIN: CPT | Performed by: NURSE PRACTITIONER

## 2023-10-16 PROCEDURE — 31231 NASAL ENDOSCOPY DX: CPT | Performed by: NURSE PRACTITIONER

## 2023-10-16 PROCEDURE — 99214 OFFICE O/P EST MOD 30 MIN: CPT | Performed by: NURSE PRACTITIONER

## 2023-10-16 ASSESSMENT — PATIENT HEALTH QUESTIONNAIRE - PHQ9
SUM OF ALL RESPONSES TO PHQ9 QUESTIONS 1 AND 2: 0
1. LITTLE INTEREST OR PLEASURE IN DOING THINGS: NOT AT ALL
2. FEELING DOWN, DEPRESSED OR HOPELESS: NOT AT ALL

## 2023-10-16 ASSESSMENT — ENCOUNTER SYMPTOMS
LOSS OF SENSATION IN FEET: 0
DEPRESSION: 0
OCCASIONAL FEELINGS OF UNSTEADINESS: 0

## 2023-10-16 ASSESSMENT — PAIN SCALES - GENERAL: PAINLEVEL: 6

## 2023-10-16 ASSESSMENT — COLUMBIA-SUICIDE SEVERITY RATING SCALE - C-SSRS
6. HAVE YOU EVER DONE ANYTHING, STARTED TO DO ANYTHING, OR PREPARED TO DO ANYTHING TO END YOUR LIFE?: NO
1. IN THE PAST MONTH, HAVE YOU WISHED YOU WERE DEAD OR WISHED YOU COULD GO TO SLEEP AND NOT WAKE UP?: NO
2. HAVE YOU ACTUALLY HAD ANY THOUGHTS OF KILLING YOURSELF?: NO

## 2023-10-16 NOTE — PATIENT INSTRUCTIONS
- Start sinus rinses 1-2 times daily. This can be purchased over the counter, see handout.   - I recommended Nasacort 2 sprays each nostril once a day. Patient was given instruction on proper application of the medication by spraying intranasally and aiming towards the outer corners of the eyes. This can be purchased over the counter.  - Obtain allergy testing. This can be obtained at any  lab.  - Follow up in 4-6 weeks. If symptoms not improving, we will likely obtain CT sinus due to persistent facial pressure.

## 2023-10-16 NOTE — PROGRESS NOTES
Subjective   Patient ID: Clair Miranda is a 53 y.o. female who presents for No chief complaint on file..    HPI  She has had sinus headaches for years. Pressure build up in her forehead. About 3 weeks ago she had an infection, went to PCP who put her on antibiotic and steroid. She hasn't totally got over it. PCP noted a nasal polyp. She has used saline spray in the past. She has used Flonase int he past, not recently. No sinus rinses. She takes Sudafed daily when she has the pressures.   She gets nasal congestion. She gets postnasal drainage and throat clearing. No facial pain. Just pressure in the frontal region. No trouble smelling. No nosebleeds. She gets itchy watery eyes and sneezing. She was tested for allergies years ago and was +. Takes Zyrtec daily. She has had 3 rhinoplasties. Last one was years ago.     No past medical history on file.    Past Surgical History:   Procedure Laterality Date    HYSTERECTOMY  08/24/2017    Hysterectomy    HYSTEROSCOPY  09/03/2014    Hysteroscopy With Endometrial Ablation    OTHER SURGICAL HISTORY  09/03/2014    Cervical Conization    OTHER SURGICAL HISTORY  09/03/2014    Dental Surgery    RHINOPLASTY  09/03/2014    Rhinoplasty    TONSILLECTOMY  09/03/2014    Tonsillectomy With Adenoidectomy     Review of Systems    All other systems have been reviewed and are negative for complaints except for those mentioned in history of present illness, past medical history and problem list.    Objective   Physical Exam    Constitutional: No fever, chills, weight loss or weight gain  General appearance: Appears well, well-nourished, well groomed. No acute distress.    Communication: Normal communication    Psychiatric: Oriented to person, place and time. Normal mood and affect.    Neurologic: Cranial nerves II-XII grossly intact and symmetric bilaterally.    Head and Face:  Head: Atraumatic with no masses, lesions or scarring.  Face: Normal symmetry. No scars or deformities.  TMJ: Normal,  no trismus.    Eyes: Conjunctiva not edematous or erythematous.     Right Ear: External inspection of ear with no deformity, scars, or masses. EAC is clear.  TM is intact with no sign of infection, effusion, or retraction.  No perforation seen.     Left Ear: External inspection of ear with no deformity, scars, or masses. EAC is clear.  TM is intact with no sign of infection, effusion, or retraction.  No perforation seen.     Nose: External inspection of nose: No nasal lesions, lacerations or scars. Anterior rhinoscopy with limited visualization past the inferior turbinates. Inferior turbinates are pale and boggy and enlarged. Tenderness on frontal sinus palpation.    Oral Cavity/Mouth: Oral cavity and oropharynx mucosa moist and pink. No lesions or masses. Dentition normal. Tonsils appear surgically removed. Uvula is midline. Tongue with no masses or lesions. Tongue with good mobility. The oropharynx is clear.    Neck: Normal appearing, symmetric, trachea midline.     Cardiovascular: Examination of peripheral vascular system shows no clubbing or cyanosis.    Respiratory: No respiratory distress increased work of breathing. Inspection of the chest with symmetric chest expansion and normal respiratory effort.    Skin: No head and neck rashes.    Lymph nodes: No adenopathy.     Nasal / sinus endoscopy    Date/Time: 10/16/2023 10:07 AM    Performed by: DIONTE Rodriguez  Authorized by: DIONTE Rodriguze    Comments:      Procedure: Flexible Nasal Endoscopy  Indication: To visualize past the inferior turbinates  Risks, benefits, alternatives, and expectations discussed with patient and [he/she] wishes to proceed.    Findings: After topical decongestion with decongestant and anesthetic spray, nasal endoscopy was performed using an endoscope. The septum was midline.   Left: The inferior turbinate was hypertrophied, pale and boggy. The middle turbinate appeared healthy. The middle meatus is free of  purulence, masses, lesions or polyps.    Right: The inferior turbinate was hypertrophied, pale and boggy. The middle turbinate appeared healthy. The middle meatus is free of purulence, masses, lesions or polyps.      The nasal passageway was patent. The nasopharynx was clear. There was clear nasal drainage bilaterally. There were no complications and the patient tolerated the procedure well.     Assessment/Plan    - Start sinus rinses daily, see handout.  - I recommended Nasacort 2 sprays each nostril once a day. Patient was given instruction on proper application of the medication by spraying intranasally and aiming towards the outer corners of the eyes. Patient was instructed to avoid the septum due to the risk of nasal bleeding.    - Obtain allergy testing. Order placed. Pending results, may add Azelastine or refer to allergy/immunology.  - Follow up in 4-6 weeks. We may obtain CT sinus.     All questions answered to patient satisfaction.

## 2023-11-27 ENCOUNTER — APPOINTMENT (OUTPATIENT)
Dept: OTOLARYNGOLOGY | Facility: CLINIC | Age: 53
End: 2023-11-27
Payer: COMMERCIAL

## 2024-06-14 DIAGNOSIS — M75.31 CALCIFIC TENDINITIS OF RIGHT SHOULDER: Primary | ICD-10-CM

## 2024-06-14 DIAGNOSIS — M75.51 BURSITIS OF RIGHT SHOULDER: ICD-10-CM

## 2024-07-11 DIAGNOSIS — M75.31 CALCIFIC TENDINITIS OF RIGHT SHOULDER: Primary | ICD-10-CM

## 2024-07-11 DIAGNOSIS — M75.31 CALCIFIC TENDONITIS OF RIGHT SHOULDER REGION: Primary | ICD-10-CM

## 2024-07-25 ENCOUNTER — HOSPITAL ENCOUNTER (OUTPATIENT)
Dept: PAIN MEDICINE | Facility: CLINIC | Age: 54
Discharge: HOME | End: 2024-07-25
Payer: COMMERCIAL

## 2024-07-25 VITALS — RESPIRATION RATE: 18 BRPM | OXYGEN SATURATION: 99 % | HEART RATE: 81 BPM

## 2024-07-25 DIAGNOSIS — M75.31 CALCIFIC TENDINITIS OF RIGHT SHOULDER: ICD-10-CM

## 2024-07-25 DIAGNOSIS — M75.31 CALCIFIC TENDONITIS OF RIGHT SHOULDER REGION: ICD-10-CM

## 2024-07-25 PROCEDURE — 2500000005 HC RX 250 GENERAL PHARMACY W/O HCPCS: Performed by: INTERNAL MEDICINE

## 2024-07-25 PROCEDURE — 2500000004 HC RX 250 GENERAL PHARMACY W/ HCPCS (ALT 636 FOR OP/ED): Performed by: INTERNAL MEDICINE

## 2024-07-25 RX ORDER — LIDOCAINE HYDROCHLORIDE 10 MG/ML
INJECTION, SOLUTION EPIDURAL; INFILTRATION; INTRACAUDAL; PERINEURAL AS NEEDED
Status: COMPLETED | OUTPATIENT
Start: 2024-07-25 | End: 2024-07-25

## 2024-07-25 RX ORDER — TRIAMCINOLONE ACETONIDE 40 MG/ML
INJECTION, SUSPENSION INTRA-ARTICULAR; INTRAMUSCULAR AS NEEDED
Status: COMPLETED | OUTPATIENT
Start: 2024-07-25 | End: 2024-07-25

## 2024-07-25 SDOH — ECONOMIC STABILITY: FOOD INSECURITY: WITHIN THE PAST 12 MONTHS, YOU WORRIED THAT YOUR FOOD WOULD RUN OUT BEFORE YOU GOT MONEY TO BUY MORE.: NEVER TRUE

## 2024-07-25 SDOH — ECONOMIC STABILITY: FOOD INSECURITY: WITHIN THE PAST 12 MONTHS, THE FOOD YOU BOUGHT JUST DIDN'T LAST AND YOU DIDN'T HAVE MONEY TO GET MORE.: NEVER TRUE

## 2024-07-25 ASSESSMENT — LIFESTYLE VARIABLES
HOW OFTEN DO YOU HAVE A DRINK CONTAINING ALCOHOL: 2-3 TIMES A WEEK
SKIP TO QUESTIONS 9-10: 1
HOW MANY STANDARD DRINKS CONTAINING ALCOHOL DO YOU HAVE ON A TYPICAL DAY: 1 OR 2
AUDIT-C TOTAL SCORE: 3
HOW OFTEN DO YOU HAVE SIX OR MORE DRINKS ON ONE OCCASION: NEVER

## 2024-07-25 ASSESSMENT — ENCOUNTER SYMPTOMS
LOSS OF SENSATION IN FEET: 0
DEPRESSION: 1
OCCASIONAL FEELINGS OF UNSTEADINESS: 0

## 2024-07-25 ASSESSMENT — PATIENT HEALTH QUESTIONNAIRE - PHQ9
1. LITTLE INTEREST OR PLEASURE IN DOING THINGS: SEVERAL DAYS
2. FEELING DOWN, DEPRESSED OR HOPELESS: SEVERAL DAYS
SUM OF ALL RESPONSES TO PHQ9 QUESTIONS 1 & 2: 2
10. IF YOU CHECKED OFF ANY PROBLEMS, HOW DIFFICULT HAVE THESE PROBLEMS MADE IT FOR YOU TO DO YOUR WORK, TAKE CARE OF THINGS AT HOME, OR GET ALONG WITH OTHER PEOPLE: NOT DIFFICULT AT ALL

## 2024-07-25 ASSESSMENT — PAIN SCALES - GENERAL: PAINLEVEL_OUTOF10: 0 - NO PAIN

## 2024-07-25 ASSESSMENT — PAIN - FUNCTIONAL ASSESSMENT: PAIN_FUNCTIONAL_ASSESSMENT: 0-10

## 2024-07-25 ASSESSMENT — COLUMBIA-SUICIDE SEVERITY RATING SCALE - C-SSRS
6. HAVE YOU EVER DONE ANYTHING, STARTED TO DO ANYTHING, OR PREPARED TO DO ANYTHING TO END YOUR LIFE?: NO
2. HAVE YOU ACTUALLY HAD ANY THOUGHTS OF KILLING YOURSELF?: NO
1. IN THE PAST MONTH, HAVE YOU WISHED YOU WERE DEAD OR WISHED YOU COULD GO TO SLEEP AND NOT WAKE UP?: NO

## 2024-07-25 NOTE — Clinical Note
Patient tolerated the procedure well and is comfortable with no complaints of pain. Vital signs stable. Arousable prior to transport. Patient transported to PACU/ post-op via stretcher/ wheelchair. Handoff completed.

## 2024-08-27 ENCOUNTER — APPOINTMENT (OUTPATIENT)
Dept: PRIMARY CARE | Facility: CLINIC | Age: 54
End: 2024-08-27
Payer: COMMERCIAL

## 2024-08-27 VITALS
BODY MASS INDEX: 28.27 KG/M2 | HEART RATE: 104 BPM | TEMPERATURE: 97.9 F | DIASTOLIC BLOOD PRESSURE: 89 MMHG | OXYGEN SATURATION: 98 % | SYSTOLIC BLOOD PRESSURE: 149 MMHG | WEIGHT: 144 LBS | HEIGHT: 60 IN

## 2024-08-27 DIAGNOSIS — I10 HYPERTENSION, BENIGN: ICD-10-CM

## 2024-08-27 DIAGNOSIS — E55.9 VITAMIN D DEFICIENCY: ICD-10-CM

## 2024-08-27 DIAGNOSIS — Z87.891 HISTORY OF SMOKING 10-25 PACK YEARS: ICD-10-CM

## 2024-08-27 DIAGNOSIS — G47.9 SLEEP DISTURBANCE: ICD-10-CM

## 2024-08-27 DIAGNOSIS — Z12.31 ENCOUNTER FOR SCREENING MAMMOGRAM FOR MALIGNANT NEOPLASM OF BREAST: ICD-10-CM

## 2024-08-27 DIAGNOSIS — Z00.00 VISIT FOR PREVENTIVE HEALTH EXAMINATION: Primary | ICD-10-CM

## 2024-08-27 PROBLEM — I74.9 TIA DUE TO EMBOLISM (MULTI): Status: RESOLVED | Noted: 2023-10-13 | Resolved: 2024-08-27

## 2024-08-27 PROBLEM — G45.9 TIA DUE TO EMBOLISM (MULTI): Status: RESOLVED | Noted: 2023-10-13 | Resolved: 2024-08-27

## 2024-08-27 PROCEDURE — 99213 OFFICE O/P EST LOW 20 MIN: CPT | Performed by: FAMILY MEDICINE

## 2024-08-27 PROCEDURE — 3008F BODY MASS INDEX DOCD: CPT | Performed by: FAMILY MEDICINE

## 2024-08-27 PROCEDURE — 3079F DIAST BP 80-89 MM HG: CPT | Performed by: FAMILY MEDICINE

## 2024-08-27 PROCEDURE — 99396 PREV VISIT EST AGE 40-64: CPT | Performed by: FAMILY MEDICINE

## 2024-08-27 PROCEDURE — 1036F TOBACCO NON-USER: CPT | Performed by: FAMILY MEDICINE

## 2024-08-27 PROCEDURE — 3077F SYST BP >= 140 MM HG: CPT | Performed by: FAMILY MEDICINE

## 2024-08-27 RX ORDER — CETIRIZINE HYDROCHLORIDE 10 MG/1
TABLET ORAL
COMMUNITY
Start: 2023-12-14

## 2024-08-27 RX ORDER — LORAZEPAM 2 MG/1
TABLET ORAL
COMMUNITY
End: 2024-08-27 | Stop reason: ALTCHOICE

## 2024-08-27 RX ORDER — LOSARTAN POTASSIUM 100 MG/1
100 TABLET ORAL DAILY
Qty: 90 TABLET | Refills: 2 | Status: SHIPPED | OUTPATIENT
Start: 2024-08-27 | End: 2025-08-27

## 2024-08-27 RX ORDER — TRAZODONE HYDROCHLORIDE 50 MG/1
50 TABLET ORAL NIGHTLY PRN
Qty: 30 TABLET | Refills: 1 | Status: SHIPPED | OUTPATIENT
Start: 2024-08-27 | End: 2025-08-27

## 2024-08-27 RX ORDER — ATORVASTATIN CALCIUM 40 MG/1
40 TABLET, FILM COATED ORAL DAILY
Qty: 90 TABLET | Refills: 2 | Status: SHIPPED | OUTPATIENT
Start: 2024-08-27 | End: 2025-08-27

## 2024-08-27 RX ORDER — ATENOLOL AND CHLORTHALIDONE TABLET 50; 25 MG/1; MG/1
1 TABLET ORAL DAILY
Qty: 90 TABLET | Refills: 2 | Status: SHIPPED | OUTPATIENT
Start: 2024-08-27 | End: 2025-08-27

## 2024-08-27 NOTE — PROGRESS NOTES
Subjective   Patient ID: Clair Miranda is a 54 y.o. female who presents for Annual Exam.    Assessment/Plan     Problem List Items Addressed This Visit       Visit for preventive health examination - Primary    Hypertension, benign    Relevant Medications    atenoloL-chlorthalidone (Tenoretic) 50-25 mg tablet    losartan (Cozaar) 100 mg tablet    Vitamin D deficiency    Relevant Medications    atorvastatin (Lipitor) 40 mg tablet    Other Relevant Orders    Vitamin D 25-Hydroxy,Total (for eval of Vitamin D levels)     Other Visit Diagnoses       Sleep disturbance        Relevant Medications    traZODone (Desyrel) 50 mg tablet    Encounter for screening mammogram for malignant neoplasm of breast        Relevant Orders    BI mammo bilateral screening tomosynthesis    History of smoking 10-25 pack years        Relevant Orders    CT lung screening low dose          Lasting lab work today  Discussed about age-related immunization  Continue current medication  CT chest for lung cancer screening - she will think about it  Discussed about shingles vaccine  Mammogram requisition provided  Discussed about diet and exercise  Mammogram in July 2021 within normal limits  Cologuard previously patient did not do- ordered today  Follow-up with OB/GYN for breast exam mammogram  Status post hysterectomy    Follow-up in 6 month  Come back early with any new sign and symptoms. Patient understands and in agreement with plan.    HPI    54-year-old female here for physical and follow-up on chronic medical conditions  TIA  hypertension anxiety nicotine dependence stopped smoking  Hyperlipidemia vitamin D deficiency  Multiple complaints  Hot flushes was seen by OB/GYN started on hormonal pills  Weight gain  Rt shoulder pain    Patient is grieving as her  recently passed away 3 4 months ago  Complaining of sleep difficulties will consider trazodone for now    Recent labs reviewed  Gained weight  Smoking present occasional alcohol no  drug use - stopped smoking since last 2 yrs. With occasional smoking in between  Stopped smoking in 2021 smoked more than 1 pack/day for more than 20 years  depression and anxiety - on Prozac - used in the past and helped       No new sign and symptoms    Allergies   Allergen Reactions    Vortioxetine Other and Nausea/vomiting       Current Outpatient Medications   Medication Sig Dispense Refill    cetirizine (ZyrTEC) 10 mg tablet take 1 tab by mouth in the morning and 2 tabs at night      atenoloL-chlorthalidone (Tenoretic) 50-25 mg tablet Take 1 tablet by mouth once daily. 90 tablet 2    atorvastatin (Lipitor) 40 mg tablet Take 1 tablet (40 mg) by mouth once daily. 90 tablet 2    betamethasone dipropionate 0.05 % cream if needed.      estrogens-methyltestosterone (Estratest HS) 0.625-1.25 mg tablet Take 1 tablet by mouth once daily.      hydrocortisone 2.5 % cream if needed.      losartan (Cozaar) 100 mg tablet Take 1 tablet (100 mg) by mouth once daily. 90 tablet 2    traZODone (Desyrel) 50 mg tablet Take 1 tablet (50 mg) by mouth as needed at bedtime for sleep. 30 tablet 1     No current facility-administered medications for this visit.       Objective   Visit Vitals  /89 (BP Location: Left arm, Patient Position: Sitting)   Pulse 104   Temp 36.6 °C (97.9 °F)   Ht 1.524 m (5')   Wt 65.3 kg (144 lb)   SpO2 98%   BMI 28.12 kg/m²   Smoking Status Former   BSA 1.66 m²     Physical Exam    Constitutional   General appearance: Alert and in no acute distress.   Head and Face   Head and face: Normal.     Palpation of the face and sinuses: Normal.    Eyes   Inspection of eyes: Sclera and conjunctiva were normal.    Pupil exam: Pupils were equal in size. Extraocular movements were intact.   Ears, Nose, Mouth, and Throat   Ears: Auricles: Normal.    Otoscopic examination: Tympanic membranes: Normal with no congestion and no discharge. Otic Canals: Normal without tenderness, congestion or discharge.    Hearing: Normal.      Nasal mucosa, septum, and turbinates: Normal without edema or erythema.    Lips, teeth, and gums: Normal.    Oropharynx: Normal with moist mucus membranes, no congestion. Tonsils: Normal no follicles.   Neck   Neck Exam: Appearance of the neck was normal. No neck masses observed.    Thyroid exam: Not enlarged and no palpable thyroid nodules.   Pulmonary   Respiratory assessment: No respiratory distress, normal respiratory rhythm and effort.    Auscultation of Lungs: Clear bilateral breath sounds.   Cardiovascular   Auscultation of heart: Apical pulse normal, heart rate and rhythm normal, normal S1 and S2, no murmurs and no pericardial rub.    Carotid arteries: Pulses normal with no bruits.    Exam for edema: No peripheral edema.   Chest   Chest: Normal A_P diameter, no pulsation, no intercostal withdrawing. Trachea central.   Abdomen   Abdominal Exam: No bruits, normal bowel sounds, soft, non-tender, no abdominal mass palpated.    Liver and Spleen exam: No hepato-splenomegaly.    Examination for hernias: Normal.    Lymphatic   Palpation of lymph nodes in neck: No cervical lymphadenopathy.   Musculoskeletal   Examination of gait: Normal.    Inspection of digits and nails: No clubbing or cyanosis of the fingernails.    Inspection/palpation of joints, bones and muscles: No joint swelling. Normal movement of all extremities.    Range of Motion: Normal movement of all extremities.   Skin   Skin inspection: Normal skin color and pigmentation, normal skin turgor and no visible rash.   Neurologic   Cranial nerves: Nerves 2-12 were intact, no focal neuro defects.    Reflexes: Normal.     Sensation: Normal.     Coordination: Normal.    Psychiatric   Judgment and insight: Intact.    Orientation: Oriented to person, place, and time.    Recent and remote memory: Normal.     Mood and affect: Normal.     Genitourinary Declined.    Immunization History   Administered Date(s) Administered    Flu vaccine (IIV4), preservative free  *Check age/dose* 03/02/2021, 10/12/2021, 09/20/2022    Influenza, Unspecified 11/03/2013, 09/01/2015    Influenza, seasonal, injectable 09/01/2015, 03/02/2021    Novel influenza-H1N1-09, preservative-free 11/16/2009    Pfizer Purple Cap SARS-CoV-2 03/28/2021, 04/19/2021    Pneumococcal conjugate vaccine, 20-valent (PREVNAR 20) 04/11/2022       Review of Systems    No visits with results within 4 Month(s) from this visit.   Latest known visit with results is:   Lab on 06/16/2023   Component Date Value Ref Range Status    Vitamin D, 25-Hydroxy 06/16/2023 23 (A)  ng/mL Final    WBC, Urine 06/16/2023 <1  0 - 5 /HPF Final    RBC, Urine 06/16/2023 None  0 - 5 /HPF Final    Squamous Epithelial Cells, Urine 06/16/2023 3  /HPF Final    Bacteria, Urine 06/16/2023 1+ (A)  /HPF Final    TSH 06/16/2023 0.96  0.44 - 3.98 mIU/L Final    Cholesterol 06/16/2023 226 (H)  0 - 199 mg/dL Final    HDL 06/16/2023 64.0  mg/dL Final    Cholesterol/HDL Ratio 06/16/2023 3.5   Final    LDL 06/16/2023 111 (H)  0 - 99 mg/dL Final    VLDL 06/16/2023 51 (H)  0 - 40 mg/dL Final    Triglycerides 06/16/2023 255 (H)  0 - 149 mg/dL Final    Non HDL Cholesterol 06/16/2023 162  mg/dL Final    Glucose 06/16/2023 74  74 - 99 mg/dL Final    Sodium 06/16/2023 142  136 - 145 mmol/L Final    Potassium 06/16/2023 4.5  3.5 - 5.3 mmol/L Final    Chloride 06/16/2023 106  98 - 107 mmol/L Final    Bicarbonate 06/16/2023 28  21 - 32 mmol/L Final    Anion Gap 06/16/2023 13  10 - 20 mmol/L Final    Urea Nitrogen 06/16/2023 14  6 - 23 mg/dL Final    Creatinine 06/16/2023 0.87  0.50 - 1.05 mg/dL Final    GFR Female 06/16/2023 80  >90 mL/min/1.73m2 Final    Calcium 06/16/2023 10.4  8.6 - 10.6 mg/dL Final    Albumin 06/16/2023 4.6  3.4 - 5.0 g/dL Final    Alkaline Phosphatase 06/16/2023 45  33 - 110 U/L Final    Total Protein 06/16/2023 7.1  6.4 - 8.2 g/dL Final    AST 06/16/2023 27  9 - 39 U/L Final    Total Bilirubin 06/16/2023 0.4  0.0 - 1.2 mg/dL Final    ALT (SGPT)  2023 29  7 - 45 U/L Final    WBC 2023 4.7  4.4 - 11.3 x10E9/L Final    nRBC 2023 0.0  0.0 - 0.0 /100 WBC Final    RBC 2023 4.85  4.00 - 5.20 x10E12/L Final    Hemoglobin 2023 14.1  12.0 - 16.0 g/dL Final    Hematocrit 2023 44.1  36.0 - 46.0 % Final    MCV 2023 91  80 - 100 fL Final    MCHC 2023 32.0  32.0 - 36.0 g/dL Final    Platelets 2023 219  150 - 450 x10E9/L Final    RDW 2023 14.4  11.5 - 14.5 % Final    Sedimentation Rate 2023 13  0 - 30 mm/h Final       Radiology: Reviewed imaging in powerchart.  No results found.    Family History   Problem Relation Name Age of Onset    Other (post op complication withPE) Mother      Other (Hemicolectomy) Mother      Other (bowel obstruction) Mother      Diverticulitis Mother      Hypertension Mother      Angina Mother      Heart attack Mother      Heart attack Father      Hypertension Father       Social History     Socioeconomic History    Marital status: Unknown   Tobacco Use    Smoking status: Former     Current packs/day: 0.00     Average packs/day: 1 pack/day for 25.0 years (25.0 ttl pk-yrs)     Types: Cigarettes     Quit date:      Years since quittin.6    Smokeless tobacco: Never   Vaping Use    Vaping status: Never Used   Substance and Sexual Activity    Alcohol use: Not Currently     Comment: rarely    Drug use: Never     Social Determinants of Health     Food Insecurity: No Food Insecurity (2024)    Hunger Vital Sign     Worried About Running Out of Food in the Last Year: Never true     Ran Out of Food in the Last Year: Never true     History reviewed. No pertinent past medical history.  Past Surgical History:   Procedure Laterality Date    HYSTERECTOMY  2017    Hysterectomy    HYSTEROSCOPY  2014    Hysteroscopy With Endometrial Ablation    OTHER SURGICAL HISTORY  2014    Cervical Conization    OTHER SURGICAL HISTORY  2014    Dental Surgery    RHINOPLASTY   09/03/2014    Rhinoplasty    TONSILLECTOMY  09/03/2014    Tonsillectomy With Adenoidectomy       Charting was completed using voice recognition technology and may include unintended errors.

## 2024-12-03 DIAGNOSIS — G47.9 SLEEP DISTURBANCE: ICD-10-CM

## 2024-12-05 RX ORDER — TRAZODONE HYDROCHLORIDE 50 MG/1
50 TABLET ORAL NIGHTLY PRN
Qty: 30 TABLET | Refills: 0 | Status: SHIPPED | OUTPATIENT
Start: 2024-12-05

## 2024-12-08 DIAGNOSIS — G47.9 SLEEP DISTURBANCE: ICD-10-CM

## 2024-12-12 RX ORDER — TRAZODONE HYDROCHLORIDE 50 MG/1
50 TABLET ORAL NIGHTLY PRN
Qty: 30 TABLET | Refills: 0 | Status: SHIPPED | OUTPATIENT
Start: 2024-12-12